# Patient Record
Sex: MALE | NOT HISPANIC OR LATINO | URBAN - METROPOLITAN AREA
[De-identification: names, ages, dates, MRNs, and addresses within clinical notes are randomized per-mention and may not be internally consistent; named-entity substitution may affect disease eponyms.]

---

## 2018-10-15 ENCOUNTER — OFFICE VISIT (OUTPATIENT)
Dept: URBAN - METROPOLITAN AREA CLINIC 17 | Facility: CLINIC | Age: 23
End: 2018-10-15
Payer: OTHER GOVERNMENT

## 2018-10-15 DIAGNOSIS — H10.022 OTHER MUCOPURULENT CONJUNCTIVITIS OF LEFT EYE: Primary | ICD-10-CM

## 2018-10-15 PROCEDURE — 99203 OFFICE O/P NEW LOW 30 MIN: CPT | Performed by: OPTOMETRIST

## 2018-10-15 RX ORDER — CIPROFLOXACIN HYDROCHLORIDE 3.5 MG/ML
0.3 % SOLUTION/ DROPS TOPICAL
Qty: 5 | Refills: 1 | Status: INACTIVE
Start: 2018-10-15 | End: 2018-11-13

## 2018-10-15 ASSESSMENT — INTRAOCULAR PRESSURE
OD: 22
OS: 16

## 2018-10-15 NOTE — IMPRESSION/PLAN
Impression: Other mucopurulent conjunctivitis of left eye: H10.022.  Plan: ciprofloxacin QID OU, AT [facility] 1 week, Pt ed on condition(although limited english understanding)